# Patient Record
Sex: FEMALE | Race: WHITE | NOT HISPANIC OR LATINO | URBAN - METROPOLITAN AREA
[De-identification: names, ages, dates, MRNs, and addresses within clinical notes are randomized per-mention and may not be internally consistent; named-entity substitution may affect disease eponyms.]

---

## 2018-06-06 ENCOUNTER — OFFICE VISIT (OUTPATIENT)
Dept: FAMILY MEDICINE | Facility: CLINIC | Age: 23
End: 2018-06-06
Payer: COMMERCIAL

## 2018-06-06 VITALS
DIASTOLIC BLOOD PRESSURE: 70 MMHG | RESPIRATION RATE: 16 BRPM | HEART RATE: 63 BPM | WEIGHT: 193 LBS | BODY MASS INDEX: 29.25 KG/M2 | SYSTOLIC BLOOD PRESSURE: 120 MMHG | TEMPERATURE: 98 F | HEIGHT: 68 IN | OXYGEN SATURATION: 98 %

## 2018-06-06 DIAGNOSIS — L29.9 ITCHY SCALP: Primary | ICD-10-CM

## 2018-06-06 DIAGNOSIS — L29.9 GENERALIZED PRURITUS: ICD-10-CM

## 2018-06-06 DIAGNOSIS — M54.9 UPPER BACK PAIN: ICD-10-CM

## 2018-06-06 PROCEDURE — 99214 OFFICE O/P EST MOD 30 MIN: CPT | Performed by: FAMILY MEDICINE

## 2018-06-06 RX ORDER — LEVONORGESTREL/ETHIN.ESTRADIOL 0.1-0.02MG
TABLET ORAL
COMMUNITY
End: 2018-06-06 | Stop reason: ALTCHOICE

## 2018-06-06 ASSESSMENT — ENCOUNTER SYMPTOMS
PALPITATIONS: 0
CHILLS: 0
NECK STIFFNESS: 0
APPETITE CHANGE: 0
SINUS PRESSURE: 0
ADENOPATHY: 0
CONSTIPATION: 0
ABDOMINAL PAIN: 0
NUMBNESS: 0
NAUSEA: 0
HEADACHES: 0
ARTHRALGIAS: 0
UNEXPECTED WEIGHT CHANGE: 0
BLOOD IN STOOL: 0
VOMITING: 0
COUGH: 0
FEVER: 0
ACTIVITY CHANGE: 0
SHORTNESS OF BREATH: 0
BACK PAIN: 1
WOUND: 0
DIARRHEA: 0
MYALGIAS: 0
FATIGUE: 0
VOICE CHANGE: 0
COLOR CHANGE: 0
DIZZINESS: 0

## 2018-06-06 NOTE — PATIENT INSTRUCTIONS
- Recommend taking CLARITIN in the AM - monitor your symptoms.  Can always add on BENADRYL at night to help with symptoms as well  - Follow up for physical in the next few weeks - we can check blood work at that time    - Recommend having initial visit with Dr. Lizama for OMT

## 2018-06-06 NOTE — PROGRESS NOTES
Subjective      Patient ID: Anuja Betts is a 22 y.o. female.    - New patient presenting to the office for itchy scalp and skin  - Symptoms began 4 days ago - she originally attributed to conditioner (different make of same brand) (bioterra long and healthy)  - Denies rash, fever, joint pain  - Symptoms located on the arms, legs, scalps itching  - Has not had a rash  - Occasional allergies to any seasonal / environmental   - Denies any other new lotions, creams, ointments.  Lives alone with no pets.  Visits boyfriend frequently but no new changes over there either.    - Also reports having upper back pain lately.  She recently just started doing olympic lifting in addition to her already very active work out routine.  Feels tight and spastic at times.  She has seen massage therapists in the past with some relief.  High level of exercise can make symptoms worse.        The following have been reviewed and updated as appropriate in this visit:  Tobacco  Allergies  Meds  Problems  Med Hx  Surg Hx  Fam Hx  Soc Hx        Review of Systems   Constitutional: Negative for activity change, appetite change, chills, fatigue, fever and unexpected weight change.   HENT: Negative for congestion, sinus pressure and voice change.    Eyes: Negative for visual disturbance.   Respiratory: Negative for cough and shortness of breath.    Cardiovascular: Negative for chest pain, palpitations and leg swelling.   Gastrointestinal: Negative for abdominal pain, blood in stool, constipation, diarrhea, nausea and vomiting.   Musculoskeletal: Positive for back pain. Negative for arthralgias, myalgias and neck stiffness.   Skin: Negative for color change, pallor, rash and wound.   Allergic/Immunologic: Positive for environmental allergies. Negative for food allergies and immunocompromised state.   Neurological: Negative for dizziness, numbness and headaches.   Hematological: Negative for adenopathy.       Objective     Vitals:     "06/06/18 1459   BP: 120/70   Patient Position: Sitting   Pulse: 63   Resp: 16   Temp: 36.7 °C (98 °F)   TempSrc: Oral   SpO2: 98%   Weight: 87.5 kg (193 lb)   Height: 1.727 m (5' 8\")     Body mass index is 29.35 kg/m².    Physical Exam   Constitutional: She is oriented to person, place, and time. She appears well-developed and well-nourished.  Non-toxic appearance. She does not have a sickly appearance. She does not appear ill. No distress.   Eyes: Conjunctivae, EOM and lids are normal. No scleral icterus.   Cardiovascular: Normal rate, regular rhythm and normal heart sounds.    Pulmonary/Chest: Effort normal. She has no decreased breath sounds. She has no wheezes. She has no rhonchi. She has no rales.   Musculoskeletal: Normal range of motion.   Mild hypertonicity of the base of the occiput musculature - possible prominent transverse processes bilaterally  ROM intact in cervical spine   Neurological: She is alert and oriented to person, place, and time.   Skin: Skin is warm, dry and intact. Capillary refill takes less than 2 seconds. No abrasion and no rash noted. She is not diaphoretic.   Nursing note and vitals reviewed.      Assessment/Plan   Problem List Items Addressed This Visit     None      Visit Diagnoses     Itchy scalp    -  Primary    Generalized pruritus        Upper back pain          1. Itchy scalp  - Continue monitoring symptoms  - Would start daily anti-histamine such as CLARITIN daily  - Can try BENADRYL at night for symptoms if persistent  - Continue keeping a log of your symptoms - can check lab work or refer to Dermatology pending improvement  - Patient to follow up in 1 week or so for physical - to check screening labs at that time    2. Generalized pruritus  - No rash on exam  - No distinct cause of symptoms but appear to be likely from an allergic response  - Plan as above  - Labs in 1 week    3. Upper back pain  - Likely intermittent rhomboid, trapezius, and paraspinal spasm / strain from " overuse with working out.  Would benefit from targeted therapy/manipulation.  - Can continue with routine exercising and stretching  - As needed NSAIDs, heat  - Recommend following up with Dr. Lizama for OMT visit    Greater than 50% of 25 minutes spent in discussion of problem, management, and/or coordination of care.

## 2018-06-19 ENCOUNTER — OFFICE VISIT (OUTPATIENT)
Dept: FAMILY MEDICINE | Facility: CLINIC | Age: 23
End: 2018-06-19
Payer: COMMERCIAL

## 2018-06-19 VITALS
SYSTOLIC BLOOD PRESSURE: 110 MMHG | TEMPERATURE: 97.8 F | HEIGHT: 68 IN | WEIGHT: 194 LBS | DIASTOLIC BLOOD PRESSURE: 70 MMHG | OXYGEN SATURATION: 98 % | RESPIRATION RATE: 16 BRPM | HEART RATE: 67 BPM | BODY MASS INDEX: 29.4 KG/M2

## 2018-06-19 DIAGNOSIS — Z00.00 WELLNESS EXAMINATION: Primary | ICD-10-CM

## 2018-06-19 PROCEDURE — 99395 PREV VISIT EST AGE 18-39: CPT | Performed by: FAMILY MEDICINE

## 2018-06-19 ASSESSMENT — ENCOUNTER SYMPTOMS
ABDOMINAL PAIN: 0
SINUS PRESSURE: 0
SLEEP DISTURBANCE: 0
BLOOD IN STOOL: 0
SORE THROAT: 0
COUGH: 0
SHORTNESS OF BREATH: 0
PALPITATIONS: 0
DIZZINESS: 0
CONSTIPATION: 0
HEMATURIA: 0
FREQUENCY: 0
VOMITING: 0
FEVER: 0
NAUSEA: 0
HEADACHES: 0
BACK PAIN: 0
ARTHRALGIAS: 0
FATIGUE: 0
CHILLS: 0
DIARRHEA: 0

## 2018-06-19 NOTE — PATIENT INSTRUCTIONS
- Check labs while fasting when you can - with call with results  - Continue follow up with Gynecology for pap smears and routine care  - Up to date on immunizations  - Follow up in 1 year for physical

## 2018-06-19 NOTE — PROGRESS NOTES
Subjective      Patient ID: Anuja Betts is a 22 y.o. female.    - Patient presenting for annual wellness exam  - GYN - goes every July; up to date on Pap smears.  Sees Dr. Shadia Mccormack with [facility]  - Lab work - due for baseline labs  - Immunizations - up to date on tdap and in general she believes   - Exercise - regularly, doing olympic lifting as of late  - Diet - pretty healthy  - Lives alone.  Has boyfriend of past 8 months, sexually active.  - Works for [employer] job  - Since last visit she has noticed an improvement in itching.  She thinks that it may have been something in a protein powder that she was eating.  CLARITIN helped symptoms.  - She is also planning on seeing a Nutritionist soon        The following have been reviewed and updated as appropriate in this visit:  Tobacco  Allergies  Meds  Problems  Med Hx  Surg Hx  Fam Hx  Soc Hx         History reviewed. No pertinent past medical history.    History reviewed. No pertinent surgical history.    Social History     Social History   • Marital status: Single     Spouse name: N/A   • Number of children: N/A   • Years of education: N/A     Occupational History   • Not on file.     Social History Main Topics   • Smoking status: Former Smoker   • Smokeless tobacco: Never Used      Comment: < 2 pack years total   • Alcohol use Yes   • Drug use: Unknown   • Sexual activity: Not on file     Other Topics Concern   • Not on file     Social History Narrative   • No narrative on file       Family History   Problem Relation Age of Onset   • Lymphoma Cousin        Latex and No known allergies    No current outpatient prescriptions on file.     No current facility-administered medications for this visit.        Review of Systems   Constitutional: Negative for chills, fatigue and fever.   HENT: Negative for congestion, sinus pressure and sore throat.    Eyes: Negative for visual disturbance.   Respiratory: Negative for cough and shortness of  "breath.    Cardiovascular: Negative for chest pain, palpitations and leg swelling.   Gastrointestinal: Negative for abdominal pain, blood in stool, constipation, diarrhea, nausea and vomiting.   Endocrine: Negative for polyuria.   Genitourinary: Negative for frequency and hematuria.   Musculoskeletal: Negative for arthralgias and back pain.   Skin: Negative for rash.   Neurological: Negative for dizziness and headaches.   Psychiatric/Behavioral: Negative for behavioral problems and sleep disturbance.       Objective     Vitals:    06/19/18 1513   BP: 110/70   Patient Position: Sitting   Pulse: 67   Resp: 16   Temp: 36.6 °C (97.8 °F)   TempSrc: Oral   SpO2: 98%   Weight: 88 kg (194 lb)   Height: 1.727 m (5' 8\")     Body mass index is 29.5 kg/m².    Physical Exam   Constitutional: She is oriented to person, place, and time. She appears well-developed and well-nourished.  Non-toxic appearance. No distress.   HENT:   Head: Normocephalic and atraumatic.   Right Ear: Tympanic membrane, external ear and ear canal normal.   Left Ear: Tympanic membrane, external ear and ear canal normal.   Nose: Nose normal.   Mouth/Throat: Uvula is midline and oropharynx is clear and moist.   Eyes: Conjunctivae are normal. Pupils are equal, round, and reactive to light.   Neck: Normal range of motion. No thyromegaly present.   Cardiovascular: Normal rate, regular rhythm, S1 normal, S2 normal and normal heart sounds.    No murmur heard.  Pulmonary/Chest: Effort normal and breath sounds normal. She has no wheezes. She has no rhonchi. She has no rales.   Abdominal: Soft. Bowel sounds are normal. There is no hepatosplenomegaly. There is no tenderness. There is no rebound and no guarding.   Musculoskeletal: Normal range of motion. She exhibits no edema.   Neurological: She is alert and oriented to person, place, and time. She has normal strength. She is not disoriented. No cranial nerve deficit or sensory deficit.   Psychiatric: She has a normal " mood and affect. Her behavior is normal. Her mood appears not anxious. She does not exhibit a depressed mood.   Nursing note and vitals reviewed.      Assessment/Plan   Problem List Items Addressed This Visit     None      Visit Diagnoses     Wellness examination    -  Primary    Relevant Orders    Comprehensive metabolic panel    Lipid panel    CBC and Differential      1. Wellness examination  - Stable exam  - Check baseline labs  - Tdap up to date  - Flu shot in the fall  - Follow up with Gyn for paps and exams  - Follow up with me in 1 year for physical  - Comprehensive metabolic panel  - Lipid panel  - CBC and Differential

## 2018-06-23 LAB
ALBUMIN SERPL-MCNC: 4.3 G/DL (ref 3.5–5.5)
ALBUMIN/GLOB SERPL: 1.5 {RATIO} (ref 1.2–2.2)
ALP SERPL-CCNC: 51 IU/L (ref 39–117)
ALT SERPL-CCNC: 18 IU/L (ref 0–32)
AST SERPL-CCNC: 26 IU/L (ref 0–40)
BASOPHILS # BLD AUTO: 0 X10E3/UL (ref 0–0.2)
BASOPHILS NFR BLD AUTO: 0 %
BILIRUB SERPL-MCNC: 0.5 MG/DL (ref 0–1.2)
BUN SERPL-MCNC: 17 MG/DL (ref 6–20)
BUN/CREAT SERPL: 20 (ref 9–23)
CALCIUM SERPL-MCNC: 8.8 MG/DL (ref 8.7–10.2)
CHLORIDE SERPL-SCNC: 101 MMOL/L (ref 96–106)
CHOLEST SERPL-MCNC: 145 MG/DL (ref 100–199)
CO2 SERPL-SCNC: 22 MMOL/L (ref 20–29)
CREAT SERPL-MCNC: 0.87 MG/DL (ref 0.57–1)
EOSINOPHIL # BLD AUTO: 0.1 X10E3/UL (ref 0–0.4)
EOSINOPHIL NFR BLD AUTO: 1 %
ERYTHROCYTE [DISTWIDTH] IN BLOOD BY AUTOMATED COUNT: 13.1 % (ref 12.3–15.4)
GFR SERPLBLD CREATININE-BSD FMLA CKD-EPI: 108 ML/MIN/1.73
GFR SERPLBLD CREATININE-BSD FMLA CKD-EPI: 94 ML/MIN/1.73
GLOBULIN SER CALC-MCNC: 2.8 G/DL (ref 1.5–4.5)
GLUCOSE SERPL-MCNC: 94 MG/DL (ref 65–99)
HCT VFR BLD AUTO: 39.5 % (ref 34–46.6)
HDLC SERPL-MCNC: 87 MG/DL
HGB BLD-MCNC: 13.1 G/DL (ref 11.1–15.9)
IMM GRANULOCYTES # BLD: 0 X10E3/UL (ref 0–0.1)
IMM GRANULOCYTES NFR BLD: 0 %
LDLC SERPL CALC-MCNC: 47 MG/DL (ref 0–99)
LYMPHOCYTES # BLD AUTO: 1.8 X10E3/UL (ref 0.7–3.1)
LYMPHOCYTES NFR BLD AUTO: 30 %
MCH RBC QN AUTO: 31 PG (ref 26.6–33)
MCHC RBC AUTO-ENTMCNC: 33.2 G/DL (ref 31.5–35.7)
MCV RBC AUTO: 94 FL (ref 79–97)
MONOCYTES # BLD AUTO: 0.4 X10E3/UL (ref 0.1–0.9)
MONOCYTES NFR BLD AUTO: 6 %
NEUTROPHILS # BLD AUTO: 3.8 X10E3/UL (ref 1.4–7)
NEUTROPHILS NFR BLD AUTO: 63 %
PLATELET # BLD AUTO: 172 X10E3/UL (ref 150–379)
POTASSIUM SERPL-SCNC: 4.2 MMOL/L (ref 3.5–5.2)
PROT SERPL-MCNC: 7.1 G/DL (ref 6–8.5)
RBC # BLD AUTO: 4.22 X10E6/UL (ref 3.77–5.28)
SODIUM SERPL-SCNC: 139 MMOL/L (ref 134–144)
TRIGL SERPL-MCNC: 53 MG/DL (ref 0–149)
VLDLC SERPL CALC-MCNC: 11 MG/DL (ref 5–40)
WBC # BLD AUTO: 6.1 X10E3/UL (ref 3.4–10.8)

## 2019-01-11 ENCOUNTER — OFFICE VISIT (OUTPATIENT)
Dept: FAMILY MEDICINE | Facility: CLINIC | Age: 24
End: 2019-01-11
Payer: COMMERCIAL

## 2019-01-11 VITALS
OXYGEN SATURATION: 98 % | HEART RATE: 67 BPM | DIASTOLIC BLOOD PRESSURE: 80 MMHG | SYSTOLIC BLOOD PRESSURE: 110 MMHG | TEMPERATURE: 98 F | WEIGHT: 196 LBS | BODY MASS INDEX: 29.8 KG/M2 | RESPIRATION RATE: 16 BRPM

## 2019-01-11 DIAGNOSIS — R51.9 ACUTE NONINTRACTABLE HEADACHE, UNSPECIFIED HEADACHE TYPE: ICD-10-CM

## 2019-01-11 DIAGNOSIS — R10.13 EPIGASTRIC PAIN: Primary | ICD-10-CM

## 2019-01-11 PROCEDURE — 99214 OFFICE O/P EST MOD 30 MIN: CPT | Performed by: FAMILY MEDICINE

## 2019-01-11 ASSESSMENT — ENCOUNTER SYMPTOMS
WEIGHT LOSS: 0
ANAL BLEEDING: 0
BELCHING: 0
DYSURIA: 0
COUGH: 0
FLATUS: 0
HEMATURIA: 0
ANOREXIA: 0
TROUBLE SWALLOWING: 0
ARTHRALGIAS: 0
HEMATOCHEZIA: 0
HEADACHES: 1
WEAKNESS: 0
FEVER: 0
NAUSEA: 0
FREQUENCY: 0
UNEXPECTED WEIGHT CHANGE: 0
DIARRHEA: 0
SHORTNESS OF BREATH: 0
ABDOMINAL PAIN: 1
MYALGIAS: 0
ABDOMINAL DISTENTION: 0
PALPITATIONS: 0
DIAPHORESIS: 0
CONSTIPATION: 0
FATIGUE: 0
BLOOD IN STOOL: 0
VOMITING: 0
CHILLS: 0

## 2019-01-11 NOTE — PROGRESS NOTES
Subjective      Patient ID: Anuja Betts is a 23 y.o. female.  1995      - Patient presenting for abdominal pain  - Has been dealing with intermittent abd pain in the epigastric region  - Has been going on for >5 years  - work up has included EGD, colonoscopy, ultrasound at an ER in [facility]  - Last time she had this pain was in July 2018      - Also has mild headache that she thinks is stress related      Abdominal Pain   This is a chronic problem. The current episode started more than 1 year ago. The onset quality is undetermined. The problem occurs intermittently. The problem has been waxing and waning. The pain is located in the epigastric region. The pain is severe. The quality of the pain is colicky. The abdominal pain radiates to the epigastric region. Associated symptoms include headaches. Pertinent negatives include no anorexia, arthralgias, belching, constipation, diarrhea, dysuria, fever, flatus, frequency, hematochezia, hematuria, melena, myalgias, nausea, vomiting or weight loss. Nothing aggravates the pain. The pain is relieved by nothing. She has tried nothing for the symptoms. The treatment provided no relief. Prior diagnostic workup includes ultrasound, upper endoscopy, lower endoscopy and GI consult.       The following have been reviewed and updated as appropriate in this visit:  Allergies  Meds  Problems       Review of Systems   Constitutional: Negative for chills, diaphoresis, fatigue, fever, unexpected weight change and weight loss.   HENT: Negative for trouble swallowing.    Eyes: Negative for visual disturbance.   Respiratory: Negative for cough and shortness of breath.    Cardiovascular: Negative for chest pain, palpitations and leg swelling.   Gastrointestinal: Positive for abdominal pain. Negative for abdominal distention, anal bleeding, anorexia, blood in stool, constipation, diarrhea, flatus, hematochezia, melena, nausea and vomiting.   Genitourinary: Negative for  dysuria, frequency and hematuria.   Musculoskeletal: Negative for arthralgias and myalgias.   Skin: Negative for rash.   Neurological: Positive for headaches. Negative for weakness.       Objective     Vitals:    01/11/19 0806   BP: 110/80   Patient Position: Sitting   Pulse: 67   Resp: 16   Temp: 36.7 °C (98 °F)   TempSrc: Oral   SpO2: 98%   Weight: 88.9 kg (196 lb)     Body mass index is 29.8 kg/m².    Physical Exam   Constitutional: She is oriented to person, place, and time. She appears well-developed and well-nourished.  Non-toxic appearance. She does not have a sickly appearance. She does not appear ill. No distress.   Cardiovascular: Normal rate, regular rhythm and normal heart sounds.  Exam reveals no friction rub.    No murmur heard.  Pulmonary/Chest: Effort normal and breath sounds normal. No respiratory distress. She has no wheezes. She has no rales.   Abdominal: Soft. Bowel sounds are normal. She exhibits no distension. There is tenderness in the epigastric area. There is no rigidity, no rebound, no guarding and no CVA tenderness.   Neurological: She is alert and oriented to person, place, and time. No cranial nerve deficit or sensory deficit.   Skin: She is not diaphoretic.   Psychiatric: She has a normal mood and affect. Her behavior is normal. Judgment and thought content normal.   Nursing note and vitals reviewed.      Assessment/Plan   Problem List Items Addressed This Visit     None      Visit Diagnoses     Epigastric pain    -  Primary    Relevant Orders    Celiac reflex panel    ULTRASOUND ABDOMEN COMPLETE    Acute nonintractable headache, unspecified headache type          1. Epigastric pain  - Possibly diet reltaed as patient's symptoms have correlated with more liberal diet  - Sounds more functional in nature  - Would check celiac panel and ultrasound if pain returns as she is current asymptomatic and has not had a flare up since 7/2018  - Consider GI referral again  - Celiac reflex panel  -  ULTRASOUND ABDOMEN COMPLETE    2. Acute nonintractable headache, unspecified headache type  - Likely stress tension HA  - As needed NSAIDS - follow up as needed    Greater than 50% of 25 minutes spent in discussion of problem, management, and/or coordination of care.

## 2019-01-11 NOTE — PATIENT INSTRUCTIONS
- Continue with consistent diet as you are and track symptoms  - If symptoms come back will do below  - Check Celiac lab test  - Check ultrasound of the abdomen  - Will consider following up Gastroenterology  - Follow up pending results

## 2019-06-26 ENCOUNTER — OFFICE VISIT (OUTPATIENT)
Dept: FAMILY MEDICINE | Facility: CLINIC | Age: 24
End: 2019-06-26
Payer: COMMERCIAL

## 2019-06-26 VITALS
WEIGHT: 190 LBS | SYSTOLIC BLOOD PRESSURE: 106 MMHG | HEIGHT: 68 IN | HEART RATE: 60 BPM | OXYGEN SATURATION: 98 % | TEMPERATURE: 98.2 F | DIASTOLIC BLOOD PRESSURE: 64 MMHG | BODY MASS INDEX: 28.79 KG/M2 | RESPIRATION RATE: 16 BRPM

## 2019-06-26 DIAGNOSIS — Z00.00 WELLNESS EXAMINATION: Primary | ICD-10-CM

## 2019-06-26 DIAGNOSIS — R22.1 THROAT SWELLING: ICD-10-CM

## 2019-06-26 DIAGNOSIS — M79.622 PAIN IN LEFT AXILLA: ICD-10-CM

## 2019-06-26 PROCEDURE — 99395 PREV VISIT EST AGE 18-39: CPT | Performed by: FAMILY MEDICINE

## 2019-06-26 ASSESSMENT — ENCOUNTER SYMPTOMS
BLOOD IN STOOL: 0
FEVER: 0
ARTHRALGIAS: 0
BACK PAIN: 0
MYALGIAS: 1
DIARRHEA: 0
COUGH: 0
CONSTIPATION: 0
NAUSEA: 0
FREQUENCY: 0
SLEEP DISTURBANCE: 0
HEADACHES: 0
DIZZINESS: 0
SORE THROAT: 0
HEMATURIA: 0
SINUS PRESSURE: 0
SHORTNESS OF BREATH: 0
ABDOMINAL PAIN: 0
VOMITING: 0
FATIGUE: 0
PALPITATIONS: 0
CHILLS: 0

## 2019-06-26 NOTE — PROGRESS NOTES
Subjective      Patient ID: Anuja Betts is a 23 y.o. female.  1995      - Patient presenting for annual wellness exam  - GYN - Sees Dr. Shadia Mccormack with [facility]; had colposcopy in October 2018, which was stable.  HPV came back positive prompting the colposcopy.  Plan for repeat Pap this October 2019  - Lab work - due for repeat biometric screening   - Immunizations - Tdap  - Exercise - very active  - Diet - well balanced  - Lives with boyfriend    Has been having some intermittent L armpit discomfort - feels like muscle pull  No mass or redness or swelling    Also has noticed some possible swelling in the upper throat area - no mass or redness or trouble swallowing        The following have been reviewed and updated as appropriate in this visit:  Tobacco  Allergies  Meds  Problems  Med Hx  Surg Hx  Fam Hx  Soc Hx         History reviewed. No pertinent past medical history.    History reviewed. No pertinent surgical history.    Social History     Social History   • Marital status: Single     Spouse name: N/A   • Number of children: N/A   • Years of education: N/A     Occupational History   • Not on file.     Social History Main Topics   • Smoking status: Never Smoker   • Smokeless tobacco: Never Used   • Alcohol use Yes   • Drug use: Unknown   • Sexual activity: Not on file     Other Topics Concern   • Not on file     Social History Narrative   • No narrative on file       Family History   Problem Relation Age of Onset   • Lymphoma Cousin        Latex and No known allergies    No current outpatient prescriptions on file.     No current facility-administered medications for this visit.        Review of Systems   Constitutional: Negative for chills, fatigue and fever.   HENT: Negative for congestion, sinus pressure and sore throat.    Eyes: Negative for visual disturbance.   Respiratory: Negative for cough and shortness of breath.    Cardiovascular: Negative for chest pain, palpitations  "and leg swelling.   Gastrointestinal: Negative for abdominal pain, blood in stool, constipation, diarrhea, nausea and vomiting.   Endocrine: Negative for polyuria.   Genitourinary: Negative for frequency and hematuria.   Musculoskeletal: Positive for myalgias. Negative for arthralgias and back pain.   Skin: Negative for rash.   Neurological: Negative for dizziness and headaches.   Psychiatric/Behavioral: Negative for behavioral problems and sleep disturbance.       Objective     Vitals:    06/26/19 1020   BP: 106/64   BP Location: Right upper arm   Patient Position: Sitting   Pulse: 60   Resp: 16   Temp: 36.8 °C (98.2 °F)   TempSrc: Temporal   SpO2: 98%   Weight: 86.2 kg (190 lb)   Height: 1.727 m (5' 8\")     Body mass index is 28.89 kg/m².    Physical Exam   Constitutional: She is oriented to person, place, and time. She appears well-developed and well-nourished.  Non-toxic appearance. No distress.   HENT:   Head: Normocephalic and atraumatic.   Right Ear: Tympanic membrane, external ear and ear canal normal.   Left Ear: Tympanic membrane, external ear and ear canal normal.   Nose: Nose normal.   Mouth/Throat: Uvula is midline and oropharynx is clear and moist.   Eyes: Pupils are equal, round, and reactive to light. Conjunctivae are normal.   Neck: Normal range of motion. No thyromegaly present.   Cardiovascular: Normal rate, regular rhythm, S1 normal, S2 normal and normal heart sounds.    No murmur heard.  Pulmonary/Chest: Effort normal and breath sounds normal. She has no wheezes. She has no rhonchi. She has no rales.   Abdominal: Soft. Bowel sounds are normal. There is no hepatosplenomegaly. There is no tenderness. There is no rebound and no guarding.   Musculoskeletal: Normal range of motion. She exhibits no edema.   Neurological: She is alert and oriented to person, place, and time. She has normal strength. She is not disoriented. No cranial nerve deficit or sensory deficit.   Psychiatric: She has a normal " mood and affect. Her behavior is normal. Her mood appears not anxious. She does not exhibit a depressed mood.   Nursing note and vitals reviewed.      Assessment/Plan   Diagnoses and all orders for this visit:    Wellness examination (Primary)  -     Lipid panel  -     Comprehensive metabolic panel    Throat swelling    Pain in left axilla    1. Wellness examination  - UTD on immunizations  - Check screening labs  - Continue follow up with Gynecology - Pap due in 10/2019   - Continue healthy lifestyle  - Follow up in 1 year    2. Throat swelling  - Likely hyoid bone was what patient is feeling  - Patient to call if becoming painful or increasing in size and will check ultrasound    3. Pain in left axilla  - Likely muscle/tendon  - Work on form for exercising  - TYLENOL as needed for pain relief  - NSAIDs in limit because of gastritis in the past

## 2019-06-26 NOTE — PATIENT INSTRUCTIONS
- Keep up the great work with activity level and lifestyle!  - Acupuncturist is a great idea  - Check fasting labs  - Continue follow up with Gynecologist  - Follow up with me in 1 year or sooner as needed

## 2019-06-29 LAB
ALBUMIN SERPL-MCNC: 4.6 G/DL (ref 3.5–5.5)
ALBUMIN/GLOB SERPL: 1.8 {RATIO} (ref 1.2–2.2)
ALP SERPL-CCNC: 42 IU/L (ref 39–117)
ALT SERPL-CCNC: 13 IU/L (ref 0–32)
AST SERPL-CCNC: 20 IU/L (ref 0–40)
BILIRUB SERPL-MCNC: 0.7 MG/DL (ref 0–1.2)
BUN SERPL-MCNC: 15 MG/DL (ref 6–20)
BUN/CREAT SERPL: 17 (ref 9–23)
CALCIUM SERPL-MCNC: 9.6 MG/DL (ref 8.7–10.2)
CHLORIDE SERPL-SCNC: 102 MMOL/L (ref 96–106)
CHOLEST SERPL-MCNC: 134 MG/DL (ref 100–199)
CO2 SERPL-SCNC: 23 MMOL/L (ref 20–29)
CREAT SERPL-MCNC: 0.86 MG/DL (ref 0.57–1)
GLOBULIN SER CALC-MCNC: 2.6 G/DL (ref 1.5–4.5)
GLUCOSE SERPL-MCNC: 88 MG/DL (ref 65–99)
HDLC SERPL-MCNC: 87 MG/DL
LAB CORP EGFR IF AFRICN AM: 109 ML/MIN/1.73
LAB CORP EGFR IF NONAFRICN AM: 94 ML/MIN/1.73
LDLC SERPL CALC-MCNC: 37 MG/DL (ref 0–99)
POTASSIUM SERPL-SCNC: 4.9 MMOL/L (ref 3.5–5.2)
PROT SERPL-MCNC: 7.2 G/DL (ref 6–8.5)
SODIUM SERPL-SCNC: 138 MMOL/L (ref 134–144)
TRIGL SERPL-MCNC: 52 MG/DL (ref 0–149)
VLDLC SERPL CALC-MCNC: 10 MG/DL (ref 5–40)

## 2019-11-12 ENCOUNTER — OFFICE VISIT (OUTPATIENT)
Dept: FAMILY MEDICINE | Facility: CLINIC | Age: 24
End: 2019-11-12
Payer: COMMERCIAL

## 2019-11-12 VITALS
DIASTOLIC BLOOD PRESSURE: 62 MMHG | HEART RATE: 64 BPM | HEIGHT: 68 IN | BODY MASS INDEX: 28.04 KG/M2 | RESPIRATION RATE: 16 BRPM | TEMPERATURE: 98.6 F | SYSTOLIC BLOOD PRESSURE: 100 MMHG | WEIGHT: 185 LBS | OXYGEN SATURATION: 96 %

## 2019-11-12 DIAGNOSIS — J01.00 ACUTE NON-RECURRENT MAXILLARY SINUSITIS: Primary | ICD-10-CM

## 2019-11-12 PROCEDURE — 99214 OFFICE O/P EST MOD 30 MIN: CPT | Performed by: FAMILY MEDICINE

## 2019-11-12 RX ORDER — AMOXICILLIN AND CLAVULANATE POTASSIUM 875; 125 MG/1; MG/1
1 TABLET, FILM COATED ORAL 2 TIMES DAILY
Qty: 14 TABLET | Refills: 0 | Status: SHIPPED | OUTPATIENT
Start: 2019-11-12 | End: 2019-11-19

## 2019-11-12 ASSESSMENT — ENCOUNTER SYMPTOMS
DIARRHEA: 0
DIAPHORESIS: 0
DIZZINESS: 0
SINUS PAIN: 0
SWOLLEN GLANDS: 0
SORE THROAT: 1
HOARSE VOICE: 0
ABDOMINAL PAIN: 0
PALPITATIONS: 0
COUGH: 0
CONSTIPATION: 0
SHORTNESS OF BREATH: 0
RHINORRHEA: 0
CHILLS: 0
UNEXPECTED WEIGHT CHANGE: 0
SINUS PRESSURE: 1
HEADACHES: 1

## 2019-11-12 NOTE — PROGRESS NOTES
"Subjective      Patient ID: Anuja Betts is a 24 y.o. female.  1995      - Patient presenting for sinus infection symptoms for 5-6 days  - Symptoms worsening  - Moderate pressure in the frontal and maxillary sinus regions    Sinusitis   This is a new problem. The current episode started in the past 7 days. The problem has been gradually worsening since onset. There has been no fever. The pain is moderate. Associated symptoms include congestion, headaches, sinus pressure and a sore throat. Pertinent negatives include no chills, coughing, diaphoresis, ear pain, hoarse voice, shortness of breath, sneezing or swollen glands. Past treatments include acetaminophen and oral decongestants. The treatment provided mild relief.       The following have been reviewed and updated as appropriate in this visit:  Allergies  Meds  Problems       Review of Systems   Constitutional: Negative for chills, diaphoresis and unexpected weight change.   HENT: Positive for congestion, postnasal drip, sinus pressure and sore throat. Negative for ear pain, hoarse voice, rhinorrhea, sinus pain and sneezing.    Eyes: Negative for visual disturbance.   Respiratory: Negative for cough and shortness of breath.    Cardiovascular: Negative for chest pain, palpitations and leg swelling.   Gastrointestinal: Negative for abdominal pain, constipation and diarrhea.   Skin: Negative for rash.   Neurological: Positive for headaches. Negative for dizziness.       Objective     Vitals:    11/12/19 0949   BP: 100/62   BP Location: Right upper arm   Patient Position: Sitting   Pulse: 64   Resp: 16   Temp: 37 °C (98.6 °F)   TempSrc: Temporal   SpO2: 96%   Weight: 83.9 kg (185 lb)   Height: 1.727 m (5' 8\")     Body mass index is 28.13 kg/m².    Physical Exam   Constitutional: She is oriented to person, place, and time. She appears well-developed and well-nourished. No distress.   HENT:   Head: Normocephalic and atraumatic.   Right Ear: External ear and " ear canal normal. A middle ear effusion is present.   Left Ear: External ear and ear canal normal. A middle ear effusion is present.   Nose: Mucosal edema and rhinorrhea present. Right sinus exhibits maxillary sinus tenderness. Left sinus exhibits maxillary sinus tenderness.   Mouth/Throat: Uvula is midline. No oropharyngeal exudate, posterior oropharyngeal edema, posterior oropharyngeal erythema or tonsillar abscesses.   Cardiovascular: Normal rate, regular rhythm and normal heart sounds. Exam reveals no gallop and no friction rub.   Pulmonary/Chest: Effort normal and breath sounds normal. No respiratory distress. She has no decreased breath sounds. She has no wheezes. She has no rhonchi. She has no rales.   Abdominal: Soft. Bowel sounds are normal.   Neurological: She is alert and oriented to person, place, and time.   Skin: She is not diaphoretic.   Psychiatric: She has a normal mood and affect. Her behavior is normal. Judgment and thought content normal.   Nursing note and vitals reviewed.      Assessment/Plan   Diagnoses and all orders for this visit:    Acute non-recurrent maxillary sinusitis (Primary)    Other orders  -     amoxicillin-pot clavulanate (AUGMENTIN) 875-125 mg per tablet; Take 1 tablet by mouth 2 (two) times a day for 7 days.    1. Acute non-recurrent maxillary sinusitis  - Start AUGMENTIN - concern for bacterial sinusitis  - Fluids and rest  - ALEVE for swelling and pain with food  - Ok to continue Mucinex  - Follow up if not improving - consider steroid burst

## 2020-05-20 ENCOUNTER — TELEPHONE (OUTPATIENT)
Dept: FAMILY MEDICINE | Facility: CLINIC | Age: 25
End: 2020-05-20

## 2020-05-20 ENCOUNTER — TELEMEDICINE (OUTPATIENT)
Dept: FAMILY MEDICINE | Facility: CLINIC | Age: 25
End: 2020-05-20
Payer: COMMERCIAL

## 2020-05-20 DIAGNOSIS — Z20.822 EXPOSURE TO COVID-19 VIRUS: ICD-10-CM

## 2020-05-20 DIAGNOSIS — R45.82 FEELING WORRIED: Primary | ICD-10-CM

## 2020-05-20 PROCEDURE — 99213 OFFICE O/P EST LOW 20 MIN: CPT | Mod: 95,CS | Performed by: FAMILY MEDICINE

## 2020-05-20 NOTE — PROGRESS NOTES
Verification of Patient Location:  The patient affirms they are currently located in the following state: [place]    Request for Consent:  You and I are about to have a telemedicine check-in or visit. This is allowed because you are already my patient, and you have requested it.  This telemedicine visit will be billed to your health insurance or you, if you are self-insured.  You understand you will be responsible for any copayments or coinsurances that apply to your telemedicine visit.  Before starting our telemedicine visit, I am required to get your consent for this virtual check-in or visit by telemedicine. Do you consent?      Patient Response to Request for Consent: Yes    The following have been reviewed and updated as appropriate in this visit:  Tobacco  Allergies  Meds  Problems  Med Hx  Surg Hx  Fam Hx  Soc Hx         History reviewed. No pertinent past medical history.    History reviewed. No pertinent surgical history.    Social History     Socioeconomic History   • Marital status: Single     Spouse name: Not on file   • Number of children: Not on file   • Years of education: Not on file   • Highest education level: Not on file   Occupational History   • Not on file   Social Needs   • Financial resource strain: Not on file   • Food insecurity:     Worry: Not on file     Inability: Not on file   • Transportation needs:     Medical: Not on file     Non-medical: Not on file   Tobacco Use   • Smoking status: Never Smoker   • Smokeless tobacco: Never Used   Substance and Sexual Activity   • Alcohol use: Yes   • Drug use: Not on file   • Sexual activity: Not on file   Lifestyle   • Physical activity:     Days per week: Not on file     Minutes per session: Not on file   • Stress: Not on file   Relationships   • Social connections:     Talks on phone: Not on file     Gets together: Not on file     Attends Bahai service: Not on file     Active member of club or organization: Not on file     Attends  meetings of clubs or organizations: Not on file     Relationship status: Not on file   • Intimate partner violence:     Fear of current or ex partner: Not on file     Emotionally abused: Not on file     Physically abused: Not on file     Forced sexual activity: Not on file   Other Topics Concern   • Not on file   Social History Narrative   • Not on file       Family History   Problem Relation Age of Onset   • Lymphoma Cousin        Latex and No known allergies    No current outpatient medications on file.     No current facility-administered medications for this visit.        Visit Documentation:  - Patient calling to set up telemed appt to discuss possibly getting tested for Covid given exposure  - States that on May 10th she was exposed to someone (her boyfriend's parents) who tested positive for Covid - boyfriend father tested positive on 5/18 at the ER and was pretty sick with low oxygen and fever  - Today, boyfriend mother came back positive as well.  She has been asymptomatic.  - Patient thinks that another family member who works at a nursing home where Covid positive residents are, was the likely exposure to the family  - She is understandably anxious  - She currently does not have any fevers, chills, cough, SOB, body aches, loss of smell, diarrhea, abd pain  - Her and boyfriend are living at home together and isolating    A/P:  1. Feeling worried  - Understandable anxious thoughts given exposure  - reassurance provided today  - Continue with plan as below  - Follow up as needed    2. Exposure to COVID-19 virus  - Given that she is asymptomatic, recommend monitoring for symptoms for now  - Recommend staying home for total of 14 days from exposure to decrease risk of possible asymptomatic spread  - Patient to call if symptoms develop and we will have testing conducted at that point  - Urgent care and ER precautions given today  - She agrees and understands plan      Time Spent in Medical Discussion During  This Encounter:  20 minutes

## 2021-04-14 DIAGNOSIS — Z34.90 PREGNANCY, UNSPECIFIED GESTATIONAL AGE: Primary | ICD-10-CM

## 2021-04-15 LAB — HCG INTACT+B SERPL-ACNC: 2854 MIU/ML

## 2021-04-16 ENCOUNTER — IMPORTED ENCOUNTER (OUTPATIENT)
Dept: URBAN - METROPOLITAN AREA CLINIC 38 | Facility: CLINIC | Age: 26
End: 2021-04-16

## 2021-04-16 PROBLEM — H52.13 MYOPIA, BILATERAL: Noted: 2021-04-16

## 2022-06-18 ASSESSMENT — KERATOMETRY
OD_AXISANGLE_DEGREES: 1
OS_K1POWER_DIOPTERS: 44.00
OS_AXISANGLE2_DEGREES: 96
OS_K2POWER_DIOPTERS: 45.00
OD_K2POWER_DIOPTERS: 45.00
OD_AXISANGLE2_DEGREES: 91
OD_K1POWER_DIOPTERS: 44.00
OS_AXISANGLE_DEGREES: 6

## 2022-06-18 ASSESSMENT — VISUAL ACUITY
OS_CC: 20/20
OD_CC: J1
OS_CC: J1
OD_CC: 20/20

## 2022-06-18 ASSESSMENT — TONOMETRY
OS_IOP_MMHG: 15
OD_IOP_MMHG: 14

## 2022-09-12 ENCOUNTER — OFFICE VISIT (OUTPATIENT)
Dept: FAMILY MEDICINE | Facility: CLINIC | Age: 27
End: 2022-09-12
Payer: COMMERCIAL

## 2022-09-12 VITALS
WEIGHT: 220.6 LBS | TEMPERATURE: 98.3 F | SYSTOLIC BLOOD PRESSURE: 110 MMHG | RESPIRATION RATE: 15 BRPM | BODY MASS INDEX: 33.43 KG/M2 | HEART RATE: 49 BPM | HEIGHT: 68 IN | DIASTOLIC BLOOD PRESSURE: 76 MMHG | OXYGEN SATURATION: 97 %

## 2022-09-12 DIAGNOSIS — Z00.00 WELLNESS EXAMINATION: Primary | ICD-10-CM

## 2022-09-12 DIAGNOSIS — L03.90 CELLULITIS, UNSPECIFIED CELLULITIS SITE: ICD-10-CM

## 2022-09-12 PROCEDURE — 99395 PREV VISIT EST AGE 18-39: CPT | Performed by: FAMILY MEDICINE

## 2022-09-12 PROCEDURE — 3008F BODY MASS INDEX DOCD: CPT | Performed by: FAMILY MEDICINE

## 2022-09-12 RX ORDER — CEPHALEXIN 250 MG/1
250 CAPSULE ORAL 3 TIMES DAILY
Qty: 21 CAPSULE | Refills: 0 | Status: SHIPPED | OUTPATIENT
Start: 2022-09-12 | End: 2022-09-22

## 2022-09-12 ASSESSMENT — ENCOUNTER SYMPTOMS
HEMATURIA: 0
HEADACHES: 0
COUGH: 0
ARTHRALGIAS: 0
PALPITATIONS: 0
COLOR CHANGE: 1
BLOOD IN STOOL: 0
DIARRHEA: 0
SINUS PRESSURE: 0
DIZZINESS: 0
SORE THROAT: 0
FEVER: 0
VOMITING: 0
ABDOMINAL PAIN: 0
BACK PAIN: 0
FREQUENCY: 0
SHORTNESS OF BREATH: 0
NAUSEA: 0
CHILLS: 0
SLEEP DISTURBANCE: 0
CONSTIPATION: 0
FATIGUE: 0

## 2022-09-12 NOTE — PROGRESS NOTES
Subjective      Patient ID: Anuja Betts is a 27 y.o. female.  1995      - Patient presenting for annual wellness exam  - GYN - UTD  - Lab work - due for labs  - Immunizations - UTD on Tdap  - Exercise - 5-7 days per week  - Diet - adjusting diet; stopped breastfeeding 1 month ago  - Lives with  and 10 month old daughter    - Of note she had a wasp sting on her R arm two days ago  - Redness is spreading  - It is painful and itchy  - It is warm to the touch  - No fevers or chills       The following have been reviewed and updated as appropriate in this visit:   Tobacco  Allergies  Meds  Problems  Med Hx  Surg Hx  Fam Hx          History reviewed. No pertinent past medical history.    History reviewed. No pertinent surgical history.    Social History     Socioeconomic History    Marital status: Single     Spouse name: Not on file    Number of children: Not on file    Years of education: Not on file    Highest education level: Not on file   Occupational History    Not on file   Tobacco Use    Smoking status: Never Smoker    Smokeless tobacco: Never Used   Vaping Use    Vaping Use: Never used   Substance and Sexual Activity    Alcohol use: Yes    Drug use: Not on file    Sexual activity: Not on file   Other Topics Concern    Not on file   Social History Narrative    Not on file     Social Determinants of Health     Financial Resource Strain: Not on file   Food Insecurity: Not on file   Transportation Needs: Not on file   Physical Activity: Not on file   Stress: Not on file   Social Connections: Not on file   Intimate Partner Violence: Not on file   Housing Stability: Not on file       Family History   Problem Relation Age of Onset    Rheum arthritis Biological Mother     Asthma Biological Father     Lymphoma Cousin     Heart disease Mother's Brother     Prostate cancer Father's Brother     Breast cancer Father's Sister        Latex and No known allergies    Current Outpatient  "Medications   Medication Sig Dispense Refill    cephalexin (KEFLEX) 250 mg capsule Take 1 capsule (250 mg total) by mouth 3 (three) times a day for 10 days. 21 capsule 0     No current facility-administered medications for this visit.       Review of Systems   Constitutional: Negative for chills, fatigue and fever.   HENT: Negative for congestion, sinus pressure and sore throat.    Eyes: Negative for visual disturbance.   Respiratory: Negative for cough and shortness of breath.    Cardiovascular: Negative for chest pain, palpitations and leg swelling.   Gastrointestinal: Negative for abdominal pain, blood in stool, constipation, diarrhea, nausea and vomiting.   Endocrine: Negative for polyuria.   Genitourinary: Negative for frequency and hematuria.   Musculoskeletal: Negative for arthralgias and back pain.   Skin: Positive for color change and rash.   Neurological: Negative for dizziness and headaches.   Psychiatric/Behavioral: Negative for behavioral problems and sleep disturbance.       Objective     Vitals:    09/12/22 1035   BP: 110/76   BP Location: Left upper arm   Patient Position: Sitting   Pulse: (!) 49   Resp: 15   Temp: 36.8 °C (98.3 °F)   TempSrc: Temporal   SpO2: 97%   Weight: 100 kg (220 lb 9.6 oz)   Height: 1.727 m (5' 8\")     Body mass index is 33.54 kg/m².    Physical Exam  Vitals and nursing note reviewed.   Constitutional:       General: She is not in acute distress.     Appearance: Normal appearance. She is well-developed. She is not ill-appearing or toxic-appearing.   HENT:      Head: Normocephalic and atraumatic.      Right Ear: Tympanic membrane, ear canal and external ear normal.      Left Ear: Tympanic membrane, ear canal and external ear normal.      Nose: Nose normal.      Mouth/Throat:      Pharynx: Uvula midline.   Eyes:      Conjunctiva/sclera: Conjunctivae normal.      Pupils: Pupils are equal, round, and reactive to light.   Neck:      Thyroid: No thyromegaly.   Cardiovascular:      " Rate and Rhythm: Normal rate and regular rhythm.      Pulses: Normal pulses.      Heart sounds: Normal heart sounds, S1 normal and S2 normal. No murmur heard.  Pulmonary:      Effort: Pulmonary effort is normal.      Breath sounds: Normal breath sounds. No decreased breath sounds, wheezing, rhonchi or rales.   Abdominal:      General: Bowel sounds are normal.      Palpations: Abdomen is soft. There is no hepatomegaly or splenomegaly.      Tenderness: There is no abdominal tenderness. There is no guarding or rebound.   Musculoskeletal:         General: Normal range of motion.      Cervical back: Normal range of motion.   Skin:     General: Skin is warm.      Findings: Erythema present. No rash.          Neurological:      General: No focal deficit present.      Mental Status: She is alert and oriented to person, place, and time. She is not disoriented.      Cranial Nerves: No cranial nerve deficit.      Sensory: No sensory deficit.   Psychiatric:         Attention and Perception: Attention normal.         Mood and Affect: Mood normal. Mood is not anxious or depressed.         Behavior: Behavior normal. Behavior is cooperative.         Cognition and Memory: Cognition normal.         Assessment/Plan   Diagnoses and all orders for this visit:    Wellness examination (Primary)  -     CBC and Differential; Future  -     Comprehensive metabolic panel; Future  -     Lipid panel; Future    Cellulitis, unspecified cellulitis site    Other orders  -     cephalexin (KEFLEX) 250 mg capsule; Take 1 capsule (250 mg total) by mouth 3 (three) times a day for 10 days.    1. Wellness examination  - Healthy exam today  - Check updated labs  - pap UTD  - Mammo per Gyn  - Continue healthy lifestyle and exercise  - Follow up in 1 year or sooner as needed  - CBC and Differential; Future  - Comprehensive metabolic panel; Future  - Lipid panel; Future  - CBC and Differential  - Comprehensive metabolic panel  - Lipid panel    2. Cellulitis,  unspecified cellulitis site  - Start KEFLEX to cover for bacterial cellulitis  - take anti-histamine for allergy response  - Call if not improving in the next two days

## 2022-11-08 LAB
ALBUMIN SERPL-MCNC: 4.7 G/DL (ref 3.9–5)
ALBUMIN/GLOB SERPL: 1.9 {RATIO} (ref 1.2–2.2)
ALP SERPL-CCNC: 67 IU/L (ref 44–121)
ALT SERPL-CCNC: 12 IU/L (ref 0–32)
AST SERPL-CCNC: 19 IU/L (ref 0–40)
BASOPHILS # BLD AUTO: 0.1 X10E3/UL (ref 0–0.2)
BASOPHILS NFR BLD AUTO: 1 %
BILIRUB SERPL-MCNC: 0.5 MG/DL (ref 0–1.2)
BUN SERPL-MCNC: 17 MG/DL (ref 6–20)
BUN/CREAT SERPL: 20 (ref 9–23)
CALCIUM SERPL-MCNC: 9.5 MG/DL (ref 8.7–10.2)
CHLORIDE SERPL-SCNC: 106 MMOL/L (ref 96–106)
CHOLEST SERPL-MCNC: 136 MG/DL (ref 100–199)
CO2 SERPL-SCNC: 23 MMOL/L (ref 20–29)
CREAT SERPL-MCNC: 0.86 MG/DL (ref 0.57–1)
EGFRCR SERPLBLD CKD-EPI 2021: 94 ML/MIN/1.73
EOSINOPHIL # BLD AUTO: 0.4 X10E3/UL (ref 0–0.4)
EOSINOPHIL NFR BLD AUTO: 6 %
ERYTHROCYTE [DISTWIDTH] IN BLOOD BY AUTOMATED COUNT: 11.8 % (ref 11.7–15.4)
GLOBULIN SER CALC-MCNC: 2.5 G/DL (ref 1.5–4.5)
GLUCOSE SERPL-MCNC: 99 MG/DL (ref 70–99)
HCT VFR BLD AUTO: 42.6 % (ref 34–46.6)
HDLC SERPL-MCNC: 66 MG/DL
HGB BLD-MCNC: 14.2 G/DL (ref 11.1–15.9)
IMM GRANULOCYTES # BLD AUTO: 0 X10E3/UL (ref 0–0.1)
IMM GRANULOCYTES NFR BLD AUTO: 0 %
LDLC SERPL CALC-MCNC: 57 MG/DL (ref 0–99)
LYMPHOCYTES # BLD AUTO: 2 X10E3/UL (ref 0.7–3.1)
LYMPHOCYTES NFR BLD AUTO: 32 %
MCH RBC QN AUTO: 31.2 PG (ref 26.6–33)
MCHC RBC AUTO-ENTMCNC: 33.3 G/DL (ref 31.5–35.7)
MCV RBC AUTO: 94 FL (ref 79–97)
MONOCYTES # BLD AUTO: 0.4 X10E3/UL (ref 0.1–0.9)
MONOCYTES NFR BLD AUTO: 7 %
NEUTROPHILS # BLD AUTO: 3.4 X10E3/UL (ref 1.4–7)
NEUTROPHILS NFR BLD AUTO: 54 %
PLATELET # BLD AUTO: 211 X10E3/UL (ref 150–450)
POTASSIUM SERPL-SCNC: 5.2 MMOL/L (ref 3.5–5.2)
PROT SERPL-MCNC: 7.2 G/DL (ref 6–8.5)
RBC # BLD AUTO: 4.55 X10E6/UL (ref 3.77–5.28)
SODIUM SERPL-SCNC: 140 MMOL/L (ref 134–144)
TRIGL SERPL-MCNC: 64 MG/DL (ref 0–149)
VLDLC SERPL CALC-MCNC: 13 MG/DL (ref 5–40)
WBC # BLD AUTO: 6.3 X10E3/UL (ref 3.4–10.8)